# Patient Record
Sex: MALE | Race: BLACK OR AFRICAN AMERICAN | Employment: UNEMPLOYED | URBAN - METROPOLITAN AREA
[De-identification: names, ages, dates, MRNs, and addresses within clinical notes are randomized per-mention and may not be internally consistent; named-entity substitution may affect disease eponyms.]

---

## 2019-09-27 ENCOUNTER — OFFICE VISIT (OUTPATIENT)
Dept: INTERNAL MEDICINE CLINIC | Age: 39
End: 2019-09-27

## 2019-09-27 VITALS
TEMPERATURE: 98.3 F | RESPIRATION RATE: 19 BRPM | HEART RATE: 79 BPM | OXYGEN SATURATION: 96 % | WEIGHT: 173 LBS | DIASTOLIC BLOOD PRESSURE: 70 MMHG | HEIGHT: 74 IN | SYSTOLIC BLOOD PRESSURE: 128 MMHG | BODY MASS INDEX: 22.2 KG/M2

## 2019-09-27 DIAGNOSIS — Z76.89 ESTABLISHING CARE WITH NEW DOCTOR, ENCOUNTER FOR: Primary | ICD-10-CM

## 2019-09-27 DIAGNOSIS — G89.29 CHRONIC LOW BACK PAIN, UNSPECIFIED BACK PAIN LATERALITY, WITH SCIATICA PRESENCE UNSPECIFIED: ICD-10-CM

## 2019-09-27 DIAGNOSIS — H54.40 BLIND RIGHT EYE: ICD-10-CM

## 2019-09-27 DIAGNOSIS — Z23 ENCOUNTER FOR IMMUNIZATION: ICD-10-CM

## 2019-09-27 DIAGNOSIS — M54.5 CHRONIC LOW BACK PAIN, UNSPECIFIED BACK PAIN LATERALITY, WITH SCIATICA PRESENCE UNSPECIFIED: ICD-10-CM

## 2019-09-27 PROBLEM — M54.50 CHRONIC LOW BACK PAIN: Status: ACTIVE | Noted: 2019-09-27

## 2019-09-27 RX ORDER — IBUPROFEN 200 MG
200 TABLET ORAL
COMMUNITY
End: 2019-09-27 | Stop reason: DRUGHIGH

## 2019-09-27 RX ORDER — IBUPROFEN 800 MG/1
800 TABLET ORAL
Qty: 60 TAB | Refills: 1 | Status: SHIPPED | OUTPATIENT
Start: 2019-09-27 | End: 2019-11-08 | Stop reason: SDUPTHER
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
## 2019-09-27 NOTE — PATIENT INSTRUCTIONS
Stopping Smoking: Care Instructions  Your Care Instructions  Cigarette smokers crave the nicotine in cigarettes. Giving it up is much harder than simply changing a habit. Your body has to stop craving the nicotine. It is hard to quit, but you can do it. There are many tools that people use to quit smoking. You may find that combining tools works best for you. There are several steps to quitting. First you get ready to quit. Then you get support to help you. After that, you learn new skills and behaviors to become a nonsmoker. For many people, a necessary step is getting and using medicine. Your doctor will help you set up the plan that best meets your needs. You may want to attend a smoking cessation program to help you quit smoking. When you choose a program, look for one that has proven success. Ask your doctor for ideas. You will greatly increase your chances of success if you take medicine as well as get counseling or join a cessation program.  Some of the changes you feel when you first quit tobacco are uncomfortable. Your body will miss the nicotine at first, and you may feel short-tempered and grumpy. You may have trouble sleeping or concentrating. Medicine can help you deal with these symptoms. You may struggle with changing your smoking habits and rituals. The last step is the tricky one: Be prepared for the smoking urge to continue for a time. This is a lot to deal with, but keep at it. You will feel better. Follow-up care is a key part of your treatment and safety. Be sure to make and go to all appointments, and call your doctor if you are having problems. It's also a good idea to know your test results and keep a list of the medicines you take. How can you care for yourself at home? · Ask your family, friends, and coworkers for support. You have a better chance of quitting if you have help and support.   · Join a support group, such as Nicotine Anonymous, for people who are trying to quit smoking. · Consider signing up for a smoking cessation program, such as the American Lung Association's Freedom from Smoking program.  · Get text messaging support. Go to the website at www.smokefree. gov to sign up for the [facility] program.  · Set a quit date. Pick your date carefully so that it is not right in the middle of a big deadline or stressful time. Once you quit, do not even take a puff. Get rid of all ashtrays and lighters after your last cigarette. Clean your house and your clothes so that they do not smell of smoke. · Learn how to be a nonsmoker. Think about ways you can avoid those things that make you reach for a cigarette. ? Avoid situations that put you at greatest risk for smoking. For some people, it is hard to have a drink with friends without smoking. For others, they might skip a coffee break with coworkers who smoke. ? Change your daily routine. Take a different route to work or eat a meal in a different place. · Cut down on stress. Calm yourself or release tension by doing an activity you enjoy, such as reading a book, taking a hot bath, or gardening. · Talk to your doctor or pharmacist about nicotine replacement therapy, which replaces the nicotine in your body. You still get nicotine but you do not use tobacco. Nicotine replacement products help you slowly reduce the amount of nicotine you need. These products come in several forms, many of them available over-the-counter:  ? Nicotine patches  ? Nicotine gum and lozenges  ? Nicotine inhaler  · Ask your doctor about bupropion (Wellbutrin) or varenicline (Chantix), which are prescription medicines. They do not contain nicotine. They help you by reducing withdrawal symptoms, such as stress and anxiety. · Some people find hypnosis, acupuncture, and massage helpful for ending the smoking habit. · Eat a healthy diet and get regular exercise. Having healthy habits will help your body move past its craving for nicotine.   · Be prepared to keep trying. Most people are not successful the first few times they try to quit. Do not get mad at yourself if you smoke again. Make a list of things you learned and think about when you want to try again, such as next week, next month, or next year. Where can you learn more? Go to http://shanell-rio.info/. Enter V276 in the search box to learn more about \"Stopping Smoking: Care Instructions. \"  Current as of: September 26, 2018  Content Version: 12.2  © 3071-8391 [employer]. Care instructions adapted under license by [employer] (which disclaims liability or warranty for this information). If you have questions about a medical condition or this instruction, always ask your healthcare professional. [address] any warranty or liability for your use of this information.

## 2019-09-27 NOTE — PROGRESS NOTES
Chief Complaint   Patient presents with    Back Pain    Establish Care     1. Have you been to the ER, urgent care clinic since your last visit? Hospitalized since your last visit? No    2. Have you seen or consulted any other health care providers outside of the [address] [place] [address] since your last visit? Include any pap smears or colon screening. No     After obtaining consent, and per orders of Dr. Dara Griffiths, injection of TDAP given by Betzaida Prakash LPN. Patient instructed to remain in clinic for 15 minutes afterwards, and to report any adverse reaction to me immediately.

## 2019-10-23 ENCOUNTER — HOSPITAL ENCOUNTER (OUTPATIENT)
Dept: MRI IMAGING | Age: 39
Discharge: HOME | End: 2019-10-23
Attending: PHYSICAL MEDICINE & REHABILITATION
Payer: COMMERCIAL

## 2019-10-23 ENCOUNTER — TELEPHONE (OUTPATIENT)
Dept: INTERNAL MEDICINE CLINIC | Age: 39
End: 2019-10-23

## 2019-10-23 DIAGNOSIS — G89.29 CHRONIC LOW BACK PAIN: ICD-10-CM

## 2019-10-23 DIAGNOSIS — M54.50 CHRONIC LOW BACK PAIN: ICD-10-CM

## 2019-10-23 PROCEDURE — 72148 MRI LUMBAR SPINE W/O DYE: CPT

## 2019-10-23 NOTE — TELEPHONE ENCOUNTER
Writer called pt back, two pt identifier verified. Victorina relayed MRI result as per letter. Pt verbalized understanding with no further questions.

## 2019-10-23 NOTE — TELEPHONE ENCOUNTER
Pt states he had MRI done today and want to know if you have the results yet. He also wants to know if he needs an appt to se you or the pain management physician.   Pt # [phone]

## 2019-11-08 DIAGNOSIS — G89.29 CHRONIC LOW BACK PAIN: ICD-10-CM

## 2019-11-08 DIAGNOSIS — M54.50 CHRONIC LOW BACK PAIN: ICD-10-CM

## 2019-11-08 RX ORDER — IBUPROFEN 800 MG/1
800 TABLET ORAL
Qty: 60 TAB | Refills: 1 | Status: SHIPPED | OUTPATIENT
Start: 2019-11-08 | End: 2020-01-02 | Stop reason: SDUPTHER

## 2019-11-08 NOTE — TELEPHONE ENCOUNTER
Dr. Irma North telephone   Received: Bernard Glass sent to [address]   Phone Number: [phone]             Pt requesting a refill on Rx Ibuprofen 800 mg. [facility] on file.  Pt has not been able to visit pain management specialist because he started a new job.

## 2020-01-02 DIAGNOSIS — G89.29 CHRONIC LOW BACK PAIN: ICD-10-CM

## 2020-01-02 DIAGNOSIS — M54.50 CHRONIC LOW BACK PAIN: ICD-10-CM

## 2020-01-03 RX ORDER — IBUPROFEN 800 MG/1
800 TABLET ORAL
Qty: 60 TAB | Refills: 1 | Status: SHIPPED | OUTPATIENT
Start: 2020-01-03 | End: 2020-03-30 | Stop reason: SDUPTHER

## 2020-03-30 DIAGNOSIS — G89.29 CHRONIC LOW BACK PAIN: ICD-10-CM

## 2020-03-30 DIAGNOSIS — M54.50 CHRONIC LOW BACK PAIN: ICD-10-CM

## 2020-03-30 RX ORDER — IBUPROFEN 800 MG/1
800 TABLET ORAL
Qty: 60 TAB | Refills: 1 | Status: SHIPPED | OUTPATIENT
Start: 2020-03-30 | End: 2020-06-01 | Stop reason: SDUPTHER

## 2020-03-30 NOTE — TELEPHONE ENCOUNTER
Patient called and stated that he is having back pain. He is needing a refill on his medication. It is to be refilled at the the [address] on file.

## 2020-06-01 ENCOUNTER — VIRTUAL VISIT (OUTPATIENT)
Dept: INTERNAL MEDICINE CLINIC | Age: 40
End: 2020-06-01

## 2020-06-01 VITALS — HEIGHT: 74 IN | BODY MASS INDEX: 22.2 KG/M2 | WEIGHT: 173 LBS

## 2020-06-01 DIAGNOSIS — M47.26 OSTEOARTHRITIS OF SPINE WITH RADICULOPATHY, LUMBAR REGION: ICD-10-CM

## 2020-06-01 DIAGNOSIS — M54.50 CHRONIC LOW BACK PAIN: ICD-10-CM

## 2020-06-01 DIAGNOSIS — M51.36 DDD (DEGENERATIVE DISC DISEASE), LUMBAR: Primary | ICD-10-CM

## 2020-06-01 DIAGNOSIS — G89.29 CHRONIC LOW BACK PAIN: ICD-10-CM

## 2020-06-01 RX ORDER — IBUPROFEN 800 MG/1
800 TABLET ORAL
Qty: 60 TAB | Refills: 1 | Status: SHIPPED | OUTPATIENT
Start: 2020-06-01 | End: 2020-07-13 | Stop reason: SDUPTHER

## 2020-06-01 RX ORDER — GABAPENTIN 100 MG/1
300 CAPSULE ORAL 2 TIMES DAILY
Qty: 60 CAP | Refills: 5 | Status: SHIPPED | OUTPATIENT
Start: 2020-06-01 | End: 2020-07-13 | Stop reason: SDUPTHER

## 2020-06-01 NOTE — PROGRESS NOTES
Chief Complaint   Patient presents with    Back Pain     X 4 YEARS     1. Have you been to the ER, urgent care clinic since your last visit? Hospitalized since your last visit? No    2. Have you seen or consulted any other health care providers outside of the [address] [place] [address] since your last visit? Include any pap smears or colon screening.  No

## 2020-06-01 NOTE — PROGRESS NOTES
Barney Breen is a 47 y.o. male who was seen by synchronous (real-time) audio-video technology on 6/1/2020. Consent: Barney Breen, who was seen by synchronous (real-time) audio-video technology, and/or his healthcare decision maker, is aware that this patient-initiated, Telehealth encounter on 6/1/2020 is a billable service, with coverage as determined by his insurance carrier. He is aware that he may receive a bill and has provided verbal consent to proceed: Yes. Assessment & Plan:       ICD-10-CM ICD-9-CM    1. DDD (degenerative disc disease), lumbar M51.36 722.52 gabapentin (NEURONTIN) 100 mg capsule      REFERRAL TO ORTHOPEDICS   2. Osteoarthritis of spine with radiculopathy, lumbar region M47.26 721.3 gabapentin (NEURONTIN) 100 mg capsule      REFERRAL TO ORTHOPEDICS     Start gabapentin 300mg qhs and increase to BID  F/u w Pain Mngmnt  Re-refer to ortho  Could also try steroids as a temporizing measure  712  Subjective:   Barney Breen is a 47 y.o. male who was seen for Back Pain (X 4 YEARS)    Pt has known lumbar disc disease, lumbar DJD and chronic pain. Pt has completed PT in the past and recently received a steroid injection by Pain Mngmnt w/o relief. Pt is currently only taking ibuprofen 800mg prn w suboptimal relief. No le weakness/bowel or bladder dysfxn. Pt has not been able to f/u w Pain Mngmnt due to COVID 19 pandemic  Prior to Admission medications    Medication Sig Start Date End Date Taking? Authorizing Provider   gabapentin (NEURONTIN) 100 mg capsule Take 3 Caps by mouth two (2) times a day. Max Daily Amount: 600 mg. 1 po qhs x 2 weeks then BID 6/1/20  Yes Thaddeus Whitley MD   ibuprofen (MOTRIN) 800 mg tablet Take 1 Tab by mouth every eight (8) hours as needed for Pain.  Take w food 3/30/20  Yes Thaddeus Whitley MD     Allergies   Allergen Reactions    Strawberry Hives           Objective:     Visit Vitals  Ht 6' 2\" (1.88 m)   Wt 173 lb (78.5 kg)   BMI 22.21 kg/m² General: alert, cooperative, no distress   Mental  status: normal mood, behavior, speech, dress, motor activity, and thought processes, able to follow commands   HENT: NCAT   Neck: no visualized mass   Resp: no respiratory distress   Neuro: no gross deficits   Skin: no discoloration or lesions of concern on visible areas   Psychiatric: normal affect, consistent with stated mood, no evidence of hallucinations     Additional exam findings: We discussed the expected course, resolution and complications of the diagnosis(es) in detail. Medication risks, benefits, costs, interactions, and alternatives were discussed as indicated. I advised him to contact the office if his condition worsens, changes or fails to improve as anticipated. He expressed understanding with the diagnosis(es) and plan. Aimee Gtz is a 47 y.o. male who was evaluated by a video visit encounter for concerns as above. Patient identification was verified prior to start of the visit. A caregiver was present when appropriate. Due to this being a TeleHealth encounter (During [facility]-38 public health emergency), evaluation of the following organ systems was limited: Vitals/Constitutional/EENT/Resp/CV/GI//MS/Neuro/Skin/Heme-Lymph-Imm. Pursuant to the emergency declaration under the [facility], 1135 waiver authority and the [employer] and [employer] Act, this Virtual  Visit was conducted, with patient's (and/or legal guardian's) consent, to reduce the patient's risk of exposure to COVID-19 and provide necessary medical care. Services were provided through a video synchronous discussion virtually to substitute for in-person clinic visit. Patient and provider were located at their individual homes.       Tracie Dwyer MD

## 2020-07-13 DIAGNOSIS — M47.26 OSTEOARTHRITIS OF SPINE WITH RADICULOPATHY, LUMBAR REGION: ICD-10-CM

## 2020-07-13 DIAGNOSIS — M51.36 DDD (DEGENERATIVE DISC DISEASE), LUMBAR: ICD-10-CM

## 2020-07-13 DIAGNOSIS — M54.50 CHRONIC LOW BACK PAIN: ICD-10-CM

## 2020-07-13 DIAGNOSIS — G89.29 CHRONIC LOW BACK PAIN: ICD-10-CM

## 2020-07-13 RX ORDER — IBUPROFEN 800 MG/1
800 TABLET ORAL
Qty: 60 TAB | Refills: 1 | Status: SHIPPED | OUTPATIENT
Start: 2020-07-13 | End: 2020-11-02

## 2020-07-13 RX ORDER — GABAPENTIN 300 MG/1
300 CAPSULE ORAL 2 TIMES DAILY
Qty: 60 CAP | Refills: 5 | Status: SHIPPED | OUTPATIENT
Start: 2020-07-13 | End: 2021-02-04 | Stop reason: DRUGHIGH

## 2020-07-13 NOTE — TELEPHONE ENCOUNTER
Pt called stating he did not  the gabapentin last month. IO called the pharmacy and they stated  htye needed clarification on the directions  And the r was never filled.   Pt # [phone]

## 2020-11-02 DIAGNOSIS — G89.29 CHRONIC LOW BACK PAIN: ICD-10-CM

## 2020-11-02 DIAGNOSIS — M54.50 CHRONIC LOW BACK PAIN: ICD-10-CM

## 2020-11-02 RX ORDER — IBUPROFEN 800 MG/1
TABLET ORAL
Qty: 60 TAB | Refills: 1 | Status: SHIPPED | OUTPATIENT
Start: 2020-11-02 | End: 2021-01-08

## 2021-01-08 DIAGNOSIS — M54.50 CHRONIC LOW BACK PAIN: ICD-10-CM

## 2021-01-08 DIAGNOSIS — G89.29 CHRONIC LOW BACK PAIN: ICD-10-CM

## 2021-01-08 RX ORDER — IBUPROFEN 800 MG/1
TABLET ORAL
Qty: 60 TAB | Refills: 1 | Status: SHIPPED | OUTPATIENT
Start: 2021-01-08 | End: 2021-03-08

## 2021-02-04 ENCOUNTER — OFFICE VISIT (OUTPATIENT)
Dept: INTERNAL MEDICINE CLINIC | Age: 41
End: 2021-02-04
Payer: COMMERCIAL

## 2021-02-04 VITALS
SYSTOLIC BLOOD PRESSURE: 146 MMHG | WEIGHT: 169.7 LBS | BODY MASS INDEX: 21.78 KG/M2 | DIASTOLIC BLOOD PRESSURE: 90 MMHG | OXYGEN SATURATION: 99 % | HEIGHT: 74 IN | RESPIRATION RATE: 19 BRPM | TEMPERATURE: 98.3 F

## 2021-02-04 DIAGNOSIS — H54.40 BLINDNESS OF RIGHT EYE WITH LOW VISION IN CONTRALATERAL EYE: ICD-10-CM

## 2021-02-04 DIAGNOSIS — H54.50 BLINDNESS OF RIGHT EYE WITH LOW VISION IN CONTRALATERAL EYE: ICD-10-CM

## 2021-02-04 DIAGNOSIS — M54.50 CHRONIC LOW BACK PAIN, UNSPECIFIED BACK PAIN LATERALITY, UNSPECIFIED WHETHER SCIATICA PRESENT: Primary | ICD-10-CM

## 2021-02-04 DIAGNOSIS — M47.26 OSTEOARTHRITIS OF SPINE WITH RADICULOPATHY, LUMBAR REGION: ICD-10-CM

## 2021-02-04 DIAGNOSIS — G89.29 CHRONIC LOW BACK PAIN, UNSPECIFIED BACK PAIN LATERALITY, UNSPECIFIED WHETHER SCIATICA PRESENT: Primary | ICD-10-CM

## 2021-02-04 DIAGNOSIS — M51.36 DDD (DEGENERATIVE DISC DISEASE), LUMBAR: ICD-10-CM

## 2021-02-04 PROCEDURE — 99214 OFFICE O/P EST MOD 30 MIN: CPT | Performed by: INTERNAL MEDICINE

## 2021-02-04 RX ORDER — TIZANIDINE 4 MG/1
TABLET ORAL
COMMUNITY
Start: 2021-01-08 | End: 2021-02-04 | Stop reason: SDUPTHER

## 2021-02-04 RX ORDER — LIDOCAINE 50 MG/G
2 PATCH TOPICAL EVERY 24 HOURS
Qty: 1 EACH | Refills: 0
Start: 2021-02-04

## 2021-02-04 RX ORDER — CELECOXIB 200 MG/1
CAPSULE ORAL
COMMUNITY
Start: 2020-12-07 | End: 2021-02-04 | Stop reason: SDUPTHER

## 2021-02-04 RX ORDER — GABAPENTIN 300 MG/1
300 CAPSULE ORAL 2 TIMES DAILY
COMMUNITY
Start: 2020-07-13 | End: 2021-02-04 | Stop reason: SDUPTHER

## 2021-02-04 RX ORDER — TIZANIDINE 4 MG/1
4 TABLET ORAL
COMMUNITY
Start: 2020-10-09

## 2021-02-04 RX ORDER — CELECOXIB 200 MG/1
CAPSULE ORAL
COMMUNITY
Start: 2020-09-21 | End: 2021-03-08 | Stop reason: ALTCHOICE

## 2021-02-04 RX ORDER — GABAPENTIN 400 MG/1
400 CAPSULE ORAL 3 TIMES DAILY
Qty: 90 CAP | Refills: 5 | Status: SHIPPED | OUTPATIENT
Start: 2021-02-04 | End: 2021-09-14

## 2021-02-04 NOTE — PROGRESS NOTES
Chief Complaint   Patient presents with    LOW BACK PAIN    Eye Problem     would like a referral to pain management     Other     pt would like to [place] [facility]-disability     1. Have you been to the ER, urgent care clinic since your last visit? Hospitalized since your last visit? No    2. Have you seen or consulted any other health care providers outside of the [address] [place] [address] since your last visit? Include any pap smears or colon screening.  Yes When: Shanita Holiday- Pain mgmt         Pt decline Flu vaccination    Pt decline Covid 19

## 2021-02-04 NOTE — PROGRESS NOTES
Tobias Valle is a 46 y.o. male and presents with LOW BACK PAIN, Eye Problem (would like a referral to pain management ), and Other (pt would like to dicuss possible-disability)  . Subjective:      Pt requests a second opinion Pain Mngmnt referral and eye care provider referral    Pt consulted ortho and was informed that surgery would not improve his symptomatology and medtronic device would be the next logical step for him. PMH- right eye blind   Chronic lbp- since 2015    -s/p cortisone injections and is considering Medtronic Spinal Cord Stimulation    -followed by Wilma Connor MD      Review of Systems  Constitutional: negative for fevers, chills, anorexia and weight loss  Eyes:   negative for visual disturbance and irritation  ENT:   negative for tinnitus,sore throat,nasal congestion,ear pains. hoarseness  Respiratory:  negative for cough, hemoptysis, dyspnea,wheezing  CV:   negative for chest pain, palpitations, lower extremity edema  GI:   negative for nausea, vomiting, diarrhea, abdominal pain,melena  Musculoskel: positive for myalgias, arthralgias, back pain, muscle weakness, joint pain  Neurological:  negative for headaches, dizziness, vertigo, memory problems and gait   Behavl/Psych: positive for feelings of  depression, mood changes due to pain    Past Medical History:   Diagnosis Date    Blind right eye     Other ill-defined conditions(799.89)     blind in right eye     Past Surgical History:   Procedure Laterality Date    HX OTHER SURGICAL      surgery done to his face. Social History     Socioeconomic History    Marital status:      Spouse name: Not on file    Number of children: Not on file    Years of education: Not on file    Highest education level: Not on file   Tobacco Use    Smoking status: Current Every Day Smoker     Packs/day: 0.50     Years: 15.00     Pack years: 7.50    Smokeless tobacco: Never Used   Substance and Sexual Activity    Alcohol use:  Yes Alcohol/week: 2.5 standard drinks     Types: 3 Cans of beer per week    Drug use: No    Sexual activity: Yes     Partners: Female     Birth control/protection: Condom     History reviewed. No pertinent family history. Current Outpatient Medications   Medication Sig Dispense Refill    celecoxib (CELEBREX) 200 mg capsule TK 1 C PO BID PRF PAIN      tiZANidine (ZANAFLEX) 4 mg tablet Take 4 mg by mouth every eight (8) hours as needed.  gabapentin (NEURONTIN) 400 mg capsule Take 1 Cap by mouth three (3) times daily. Max Daily Amount: 1,200 mg. 90 Cap 5    lidocaine (LIDODERM) 5 % 2 Patches by TransDERmal route every twenty-four (24) hours. Apply patch to the affected area for 12 hours a day and remove for 12 hours a day. 1 Each 0    ibuprofen (MOTRIN) 800 mg tablet TAKE 1 TABLET BY MOUTH EVERY 8 HOURS WITH FOOD AS NEEDED FOR PAIN 60 Tab 1     Allergies   Allergen Reactions    Strawberry Hives       Objective:  Visit Vitals  BP (!) 146/90 (BP 1 Location: Left upper arm, BP Patient Position: Sitting, BP Cuff Size: Adult)   Temp 98.3 °F (36.8 °C) (Oral)   Resp 19   Ht 6' 2\" (1.88 m)   Wt 169 lb 11.2 oz (77 kg)   SpO2 99%   BMI 21.79 kg/m²     Physical Exam:   General appearance - alert, well appearing, tall, in pain. Tearful  Mental status - alert, oriented to person, place, and time  EYE-EOMI  Neck - supple,   Chest - symmetric air entry    Ext-no pedal edema, no clubbing or cyanosis  Skin-Warm and dry.  no hyperpigmentation, vitiligo, or suspicious lesions  Neuro -alert, oriented, normal speech, no focal findings or movement disorder noted        Results for orders placed or performed during the hospital encounter of 09/08/15   CHLAMYDIA/GC AMPLIFIED   Result Value Ref Range    Sample type URINE      Source URINE      Chlamydia amplified POSITIVE (A) NEG      N. gonorrhea, amplified POSITIVE (A) NEG      Comment (NOTE)    CULTURE, URINE    Specimen: Urine   Result Value Ref Range    Special Requests: NO SPECIAL REQUESTS      Pierron Count <1,000 COLONIES/mL      Culture result: NO GROWTH 1 DAY     URINALYSIS W/ REFLEX CULTURE    Specimen: Urine   Result Value Ref Range    Color YELLOW/STRAW      Appearance CLEAR CLEAR      Specific gravity 1.025 1.003 - 1.030      pH (UA) 5.5 5.0 - 8.0      Protein NEGATIVE  NEG mg/dL    Glucose NEGATIVE  NEG mg/dL    Ketone NEGATIVE  NEG mg/dL    Bilirubin NEGATIVE  NEG      Blood NEGATIVE  NEG      Urobilinogen 1.0 0.2 - 1.0 EU/dL    Nitrites NEGATIVE  NEG      Leukocyte Esterase SMALL (A) NEG      WBC >100 (H) 0 - 4 /hpf    RBC 0-5 0 - 5 /hpf    Epithelial cells FEW FEW /lpf    Bacteria 1+ (A) NEG /hpf    UA:UC IF INDICATED URINE CULTURE ORDERED (A) CNI      Mucus 1+ (A) NEG /lpf       Assessment/Plan:    ICD-10-CM ICD-9-CM    1. Chronic low back pain, unspecified back pain laterality, unspecified whether sciatica present  M54.5 724.2 REFERRAL TO PAIN MANAGEMENT    G89.29 338.29 gabapentin (NEURONTIN) 400 mg capsule      lidocaine (LIDODERM) 5 %   2. DDD (degenerative disc disease), lumbar  M51.36 722.52 gabapentin (NEURONTIN) 400 mg capsule      lidocaine (LIDODERM) 5 %   3. Osteoarthritis of spine with radiculopathy, lumbar region  M47.26 721.3 gabapentin (NEURONTIN) 400 mg capsule      lidocaine (LIDODERM) 5 %   4.  Blindness of right eye with low vision in contralateral eye  H54.40 369.10 REFERRAL TO OPTOMETRY    H54.50       Orders Placed This Encounter    REFERRAL TO PAIN MANAGEMENT     Referral Priority:   Routine     Referral Type:   Consultation     Referral Reason:   Specialty Services Required     Requested Specialty:   Pain Management     Number of Visits Requested:   1    REFERRAL TO OPTOMETRY     Referral Priority:   Routine     Referral Type:   Consultation     Referral Reason:   Specialty Services Required     Referred to Provider:   Isma Goldberg MD     Requested Specialty:   Optometry     Number of Visits Requested:   1    celecoxib (CELEBREX) 200 mg capsule     Sig: TK 1 C PO BID PRF PAIN    DISCONTD: celecoxib (CELEBREX) 200 mg capsule     Sig: TAKE 1 CAPSULE BY MOUTH TWICE DAILY AS NEEDED FOR PAIN    tiZANidine (ZANAFLEX) 4 mg tablet     Sig: Take 4 mg by mouth every eight (8) hours as needed.  DISCONTD: tiZANidine (ZANAFLEX) 4 mg tablet    DISCONTD: gabapentin (NEURONTIN) 300 mg capsule     Sig: Take 300 mg by mouth two (2) times a day.  gabapentin (NEURONTIN) 400 mg capsule     Sig: Take 1 Cap by mouth three (3) times daily. Max Daily Amount: 1,200 mg. Dispense:  90 Cap     Refill:  5     HIGHER DOSE    lidocaine (LIDODERM) 5 %     Si Patches by TransDERmal route every twenty-four (24) hours. Apply patch to the affected area for 12 hours a day and remove for 12 hours a day. Dispense:  1 Each     Refill:  0     1. Chronic low back pain, unspecified back pain laterality, unspecified whether sciatica present  T/c SSRI for mood component of pts pain  - REFERRAL TO PAIN MANAGEMENT  - gabapentin (NEURONTIN) 400 mg capsule; Take 1 Cap by mouth three (3) times daily. Max Daily Amount: 1,200 mg. Dispense: 90 Cap; Refill: 5  - lidocaine (LIDODERM) 5 %; 2 Patches by TransDERmal route every twenty-four (24) hours. Apply patch to the affected area for 12 hours a day and remove for 12 hours a day. Dispense: 1 Each; Refill: 0    2. DDD (degenerative disc disease), lumbar  Titrate dose up  - gabapentin (NEURONTIN) 400 mg capsule; Take 1 Cap by mouth three (3) times daily. Max Daily Amount: 1,200 mg. Dispense: 90 Cap; Refill: 5  - lidocaine (LIDODERM) 5 %; 2 Patches by TransDERmal route every twenty-four (24) hours. Apply patch to the affected area for 12 hours a day and remove for 12 hours a day. Dispense: 1 Each; Refill: 0    3. Osteoarthritis of spine with radiculopathy, lumbar region    - gabapentin (NEURONTIN) 400 mg capsule; Take 1 Cap by mouth three (3) times daily. Max Daily Amount: 1,200 mg. Dispense: 90 Cap;  Refill: 5  - lidocaine (LIDODERM) 5 %; 2 Patches by TransDERmal route every twenty-four (24) hours. Apply patch to the affected area for 12 hours a day and remove for 12 hours a day. Dispense: 1 Each; Refill: 0    4. Blindness of right eye with low vision in contralateral eye    - REFERRAL TO OPTOMETRY      There are no Patient Instructions on file for this visit. Follow-up and Dispositions    · Return if symptoms worsen or fail to improve. I have reviewed with the patient details of the assessment and plan and all questions were answered. Relevent patient education was performed. The most recent lab findings were reviewed with the patient. An After Visit Summary was printed and given to the patient.

## 2021-03-05 DIAGNOSIS — G89.29 CHRONIC LOW BACK PAIN: ICD-10-CM

## 2021-03-05 DIAGNOSIS — M54.50 CHRONIC LOW BACK PAIN: ICD-10-CM

## 2021-03-08 RX ORDER — IBUPROFEN 800 MG/1
TABLET ORAL
Qty: 60 TAB | Refills: 1 | Status: SHIPPED | OUTPATIENT
Start: 2021-03-08 | End: 2021-05-12 | Stop reason: SDUPTHER

## 2021-05-10 DIAGNOSIS — M54.50 CHRONIC LOW BACK PAIN: ICD-10-CM

## 2021-05-10 DIAGNOSIS — G89.29 CHRONIC LOW BACK PAIN: ICD-10-CM

## 2021-05-10 RX ORDER — IBUPROFEN 800 MG/1
TABLET ORAL
Qty: 60 TAB | Refills: 1 | OUTPATIENT
Start: 2021-05-10

## 2021-05-12 DIAGNOSIS — G89.29 CHRONIC LOW BACK PAIN: ICD-10-CM

## 2021-05-12 DIAGNOSIS — M54.50 CHRONIC LOW BACK PAIN: ICD-10-CM

## 2021-05-12 RX ORDER — IBUPROFEN 800 MG/1
800 TABLET ORAL
Qty: 60 TAB | Refills: 1 | Status: SHIPPED | OUTPATIENT
Start: 2021-05-12 | End: 2021-07-13

## 2021-07-08 ENCOUNTER — TRANSCRIBE ORDER (OUTPATIENT)
Dept: SCHEDULING | Age: 41
End: 2021-07-08

## 2021-07-08 DIAGNOSIS — M54.50 LOW BACK PAIN: Primary | ICD-10-CM

## 2021-07-08 DIAGNOSIS — M79.18 BUTTOCK PAIN: ICD-10-CM

## 2021-07-08 DIAGNOSIS — M48.00 SPINAL STENOSIS: ICD-10-CM

## 2021-07-11 DIAGNOSIS — M54.50 CHRONIC LOW BACK PAIN: ICD-10-CM

## 2021-07-11 DIAGNOSIS — G89.29 CHRONIC LOW BACK PAIN: ICD-10-CM

## 2021-07-13 RX ORDER — IBUPROFEN 800 MG/1
TABLET ORAL
Qty: 60 TABLET | Refills: 1 | Status: SHIPPED | OUTPATIENT
Start: 2021-07-13 | End: 2021-10-18

## 2021-07-21 ENCOUNTER — HOSPITAL ENCOUNTER (OUTPATIENT)
Dept: MRI IMAGING | Age: 41
Discharge: HOME | End: 2021-07-21
Attending: PHYSICAL MEDICINE & REHABILITATION
Payer: COMMERCIAL

## 2021-07-21 DIAGNOSIS — M79.18 BUTTOCK PAIN: ICD-10-CM

## 2021-07-21 DIAGNOSIS — M48.00 SPINAL STENOSIS: ICD-10-CM

## 2021-07-21 DIAGNOSIS — M54.50 LOW BACK PAIN: ICD-10-CM

## 2021-07-21 PROCEDURE — 72148 MRI LUMBAR SPINE W/O DYE: CPT

## 2021-10-17 DIAGNOSIS — G89.29 CHRONIC LOW BACK PAIN: ICD-10-CM

## 2021-10-17 DIAGNOSIS — M54.50 CHRONIC LOW BACK PAIN: ICD-10-CM

## 2021-10-18 RX ORDER — IBUPROFEN 800 MG/1
TABLET ORAL
Qty: 60 TABLET | Refills: 1 | Status: SHIPPED | OUTPATIENT
Start: 2021-10-18 | End: 2022-01-04

## 2022-01-04 DIAGNOSIS — M54.50 CHRONIC LOW BACK PAIN: ICD-10-CM

## 2022-01-04 DIAGNOSIS — G89.29 CHRONIC LOW BACK PAIN: ICD-10-CM

## 2022-01-04 RX ORDER — IBUPROFEN 800 MG/1
TABLET ORAL
Qty: 60 TABLET | Refills: 1 | Status: SHIPPED | OUTPATIENT
Start: 2022-01-04

## 2022-02-03 ENCOUNTER — HOSPITAL ENCOUNTER (OUTPATIENT)
Dept: GENERAL RADIOLOGY | Age: 42
Discharge: HOME | End: 2022-02-03
Attending: PHYSICIAN ASSISTANT
Payer: COMMERCIAL

## 2022-02-03 ENCOUNTER — TRANSCRIBE ORDER (OUTPATIENT)
Dept: GENERAL RADIOLOGY | Age: 42
End: 2022-02-03

## 2022-02-03 DIAGNOSIS — M25.561 RIGHT KNEE PAIN: ICD-10-CM

## 2022-02-03 DIAGNOSIS — M47.896 OTHER OSTEOARTHRITIS OF SPINE, LUMBAR REGION: ICD-10-CM

## 2022-02-03 DIAGNOSIS — M47.896 OTHER OSTEOARTHRITIS OF SPINE, LUMBAR REGION: Primary | ICD-10-CM

## 2022-02-03 PROCEDURE — 72114 X-RAY EXAM L-S SPINE BENDING: CPT

## 2022-02-03 PROCEDURE — 73562 X-RAY EXAM OF KNEE 3: CPT

## 2022-03-19 PROBLEM — M54.50 CHRONIC LOW BACK PAIN: Status: ACTIVE | Noted: 2019-09-27

## 2022-03-19 PROBLEM — G89.29 CHRONIC LOW BACK PAIN: Status: ACTIVE | Noted: 2019-09-27

## 2022-03-20 PROBLEM — H54.40 BLIND RIGHT EYE: Status: ACTIVE | Noted: 2019-09-27
